# Patient Record
Sex: MALE | Race: WHITE | NOT HISPANIC OR LATINO | Employment: FULL TIME | URBAN - METROPOLITAN AREA
[De-identification: names, ages, dates, MRNs, and addresses within clinical notes are randomized per-mention and may not be internally consistent; named-entity substitution may affect disease eponyms.]

---

## 2021-07-07 PROBLEM — Z98.84 H/O BARIATRIC SURGERY: Status: ACTIVE | Noted: 2021-06-29

## 2021-07-07 PROBLEM — Z12.11 COLON CANCER SCREENING: Status: ACTIVE | Noted: 2021-07-07

## 2021-09-09 ENCOUNTER — NURSE TRIAGE (OUTPATIENT)
Dept: OTHER | Facility: OTHER | Age: 51
End: 2021-09-09

## 2021-09-09 NOTE — TELEPHONE ENCOUNTER
Reason for Disposition   [1] Caller has URGENT medicine question about med that PCP or specialist prescribed AND [2] triager unable to answer question    Additional Information   Medication questions    Answer Assessment - Initial Assessment Questions  1  REASON FOR CALL or QUESTION: "What is your reason for calling today?" or "How can I best help you?" or "What question do you have that I can help answer?"      Scheduled for colonoscopy, did bowel prep, has not had a bowel movement, should he come in for procedure?     Protocols used: MEDICATION QUESTION CALL-ADULT-, INFORMATION ONLY CALL-ADULT-

## 2021-12-02 ENCOUNTER — HOSPITAL ENCOUNTER (OUTPATIENT)
Dept: NON INVASIVE DIAGNOSTICS | Facility: CLINIC | Age: 51
Discharge: HOME | End: 2021-12-02
Payer: COMMERCIAL

## 2021-12-02 VITALS
DIASTOLIC BLOOD PRESSURE: 82 MMHG | WEIGHT: 177 LBS | HEART RATE: 53 BPM | BODY MASS INDEX: 25.34 KG/M2 | HEIGHT: 70 IN | SYSTOLIC BLOOD PRESSURE: 118 MMHG

## 2021-12-02 DIAGNOSIS — R01.1 HEART MURMUR: ICD-10-CM

## 2021-12-02 LAB
AORTIC ROOT: 4 CM
APICAL FOUR CHAMBER EJECTION FRACTION: 62 %
E WAVE DECELERATION TIME: 212 MS
FRACTIONAL SHORTENING: 33 % (ref 28–44)
INTERVENTRICULAR SEPTUM IN DIASTOLE (PARASTERNAL SHORT AXIS VIEW): 0.9 CM
LEFT ATRIUM AREA SYSTOLE SINGLE PLANE A4C: 21 CM2
LEFT INTERNAL DIMENSION IN SYSTOLE: 3.4 CM (ref 2.1–4)
LEFT VENTRICULAR INTERNAL DIMENSION IN DIASTOLE: 5.1 CM (ref 4.78–7.12)
LEFT VENTRICULAR POSTERIOR WALL IN END DIASTOLE: 0.9 CM
LEFT VENTRICULAR STROKE VOLUME: 74 ML
MV E'TISSUE VEL-SEP: 10 CM/S
MV PEAK A VEL: 0.62 M/S
MV PEAK E VEL: 90 CM/S
MV STENOSIS PRESSURE HALF TIME: 0 MS
RIGHT ATRIUM AREA SYSTOLE A4C: 15.7 CM2
RIGHT VENTRICLE ID DIMENSION: 3.1 CM
SL CV LV EF: 60
SL CV PED ECHO LEFT VENTRICLE DIASTOLIC VOLUME (MOD BIPLANE) 2D: 123 ML
SL CV PED ECHO LEFT VENTRICLE SYSTOLIC VOLUME (MOD BIPLANE) 2D: 49 ML
TRICUSPID VALVE PEAK REGURGITATION VELOCITY: 2.27 M/S
TRICUSPID VALVE S': 0.9 CM/S
TV PEAK GRADIENT: 21 MMHG
Z-SCORE OF LEFT VENTRICULAR DIMENSION IN END SYSTOLE: -1.34

## 2021-12-02 PROCEDURE — 93306 TTE W/DOPPLER COMPLETE: CPT | Performed by: INTERNAL MEDICINE

## 2021-12-02 PROCEDURE — 93306 TTE W/DOPPLER COMPLETE: CPT

## 2022-10-12 PROBLEM — Z12.11 COLON CANCER SCREENING: Status: RESOLVED | Noted: 2021-07-07 | Resolved: 2022-10-12

## 2024-10-16 DIAGNOSIS — Z23 NEED FOR IMMUNIZATION AGAINST INFLUENZA: Primary | ICD-10-CM

## 2025-04-12 ENCOUNTER — OFFICE VISIT (OUTPATIENT)
Dept: URGENT CARE | Facility: MEDICAL CENTER | Age: 55
End: 2025-04-12
Payer: COMMERCIAL

## 2025-04-12 VITALS
OXYGEN SATURATION: 98 % | HEART RATE: 80 BPM | SYSTOLIC BLOOD PRESSURE: 122 MMHG | DIASTOLIC BLOOD PRESSURE: 72 MMHG | TEMPERATURE: 98 F | RESPIRATION RATE: 18 BRPM

## 2025-04-12 DIAGNOSIS — M10.9 ACUTE GOUT OF LEFT ANKLE, UNSPECIFIED CAUSE: Primary | ICD-10-CM

## 2025-04-12 PROBLEM — R79.89 ABNORMAL LIVER FUNCTION TESTS: Status: ACTIVE | Noted: 2021-06-29

## 2025-04-12 PROBLEM — S52.539A COLLES' FRACTURE: Status: ACTIVE | Noted: 2025-04-12

## 2025-04-12 PROBLEM — E60 ZINC DEFICIENCY: Status: ACTIVE | Noted: 2021-06-29

## 2025-04-12 PROBLEM — I10 HTN (HYPERTENSION): Status: ACTIVE | Noted: 2021-06-29

## 2025-04-12 PROBLEM — E11.9 DIABETES (HCC): Chronic | Status: ACTIVE | Noted: 2021-06-29

## 2025-04-12 PROBLEM — M10.071 ACUTE IDIOPATHIC GOUT OF RIGHT FOOT: Status: ACTIVE | Noted: 2023-01-06

## 2025-04-12 PROBLEM — Z98.890 HISTORY OF COLONOSCOPY: Status: ACTIVE | Noted: 2021-09-10

## 2025-04-12 PROBLEM — K90.9 MALABSORPTION: Status: ACTIVE | Noted: 2021-06-29

## 2025-04-12 PROBLEM — E55.9 VITAMIN D DEFICIENCY: Status: ACTIVE | Noted: 2021-06-29

## 2025-04-12 PROBLEM — E56.0 VITAMIN E DEFICIENCY: Status: ACTIVE | Noted: 2021-06-29

## 2025-04-12 PROBLEM — E88.810 METABOLIC SYNDROME: Status: ACTIVE | Noted: 2021-06-29

## 2025-04-12 PROBLEM — N52.1 ERECTILE DISORDER DUE TO MEDICAL CONDITION IN MALE: Status: ACTIVE | Noted: 2023-01-06

## 2025-04-12 PROBLEM — R01.1 HEART MURMUR: Status: ACTIVE | Noted: 2025-04-12

## 2025-04-12 PROBLEM — Z77.29 EXPOSURE TO POTENTIALLY HAZARDOUS SUBSTANCE: Status: ACTIVE | Noted: 2025-04-12

## 2025-04-12 PROBLEM — K76.0 FATTY LIVER DISEASE, NONALCOHOLIC: Status: ACTIVE | Noted: 2021-06-29

## 2025-04-12 PROCEDURE — 99213 OFFICE O/P EST LOW 20 MIN: CPT

## 2025-04-12 RX ORDER — METHYLPREDNISOLONE 4 MG/1
TABLET ORAL
Qty: 1 EACH | Refills: 0 | Status: SHIPPED | OUTPATIENT
Start: 2025-04-12

## 2025-04-12 NOTE — PATIENT INSTRUCTIONS
Take steroids as prescribed.   Do not take NSAIDS, including Aleve, Ibuprofen or Mobic while on steroids.   Recommend to take them with food  May take Acetaminophen for pain.     Rest   Ice  Elevate    Avoidance of alcohol recommended, and written literature is given along with a low purine diet.    Indications for the use of allopurinol for prophylaxis and the use of colchicine to prevent or treat flare-ups is also discussed.    Follow up recommended with PCP in 3-5 days.  Proceed to ER if symptoms worsen.

## 2025-04-12 NOTE — PROGRESS NOTES
[facility]        NAME: Rj Mcpherson is a 55 y.o. male  : 1970    MRN: [id number]  DATE: 2025  TIME: 12:37 AM    Assessment and Plan   Acute gout of left ankle, unspecified cause [M10.9]  1. Acute gout of left ankle, unspecified cause  methylPREDNISolone 4 MG tablet therapy pack            Patient Instructions   Take steroids as prescribed.   Do not take NSAIDS, including Aleve, Ibuprofen or Mobic while on steroids.   Recommend to take them with food  May take Acetaminophen for pain.     Rest   Ice  Elevate    Avoidance of alcohol recommended, and written literature is given along with a low purine diet.    Indications for the use of allopurinol for prophylaxis and the use of colchicine to prevent or treat flare-ups is also discussed.    Follow up recommended with PCP in 3-5 days.  Proceed to ER if symptoms worsen.    Follow up with PCP in 3-5 days.  Proceed to  ER if symptoms worsen.    If tests are performed, our office will contact you with results only if changes need to made to the care plan discussed with you at the visit. [facility]    Chief Complaint     Chief Complaint   Patient presents with   • Gout     Left medial ankle gout; states it is tender to the touch      History of Present Illness       Ankle Pain   There was no injury mechanism (Pt reports a PMH of gout). The pain is present in the left ankle. The pain has been Constant since onset. Pertinent negatives include no inability to bear weight, loss of motion, loss of sensation, numbness or tingling. He reports no foreign bodies present. The symptoms are aggravated by palpation.       Review of Systems   Review of Systems   Constitutional: Negative.  Negative for chills, diaphoresis and fever.   Respiratory: Negative.  Negative for cough, shortness of breath and wheezing.    Cardiovascular: Negative.  Negative for chest pain and palpitations.   Musculoskeletal:  Positive for  arthralgias and joint swelling. Negative for myalgias.   Skin:  Positive for color change (redness). Negative for rash and wound.   Neurological:  Negative for tingling and numbness.     Current Medications       Current Outpatient Medications:   •  methylPREDNISolone 4 MG tablet therapy pack, Use as directed on package, Disp: 1 each, Rfl: 0  •  acyclovir (ZOVIRAX) 800 mg tablet, Take 800 mg by mouth daily, Disp: , Rfl:   •  B Complex Vitamins (B-Complex/B-12) TABS, Take 1 tablet by mouth daily, Disp: , Rfl:   •  beta carotene 15 MG capsule, Take 15 mg by mouth daily, Disp: , Rfl:   •  Iron, Ferrous Sulfate, 142 (45 Fe) MG TBCR, Take 45 mg by mouth daily, Disp: , Rfl:   •  Multiple Vitamin (multivitamin) capsule, Take 1 capsule by mouth daily, Disp: , Rfl:   •  polyethylene glycol-electrolytes (NULYTELY) 4000 mL solution, Take 4,000 mL by mouth once for 1 dose, Disp: 4000 mL, Rfl: 0  •  Sod Picosulfate-Mag Ox-Cit Acd (Clenpiq) 10-3.5-12 MG-GM -GM/160ML SOLN, Take 1 kit by mouth see administration instructions Colonoscopy bowel prep, Disp: 1 mL, Rfl: 0  •  tadalafil (CIALIS) 20 MG tablet, Take 20 mg by mouth daily as needed for erectile dysfunction, Disp: , Rfl:   •  VITAMIN A PO, Take 3,000 mcg by mouth daily, Disp: , Rfl:   •  vitamin B-12 (VITAMIN B-12) 1,000 mcg tablet, Take 1,000 mcg by mouth daily, Disp: , Rfl:   •  Vitamin D, Cholecalciferol, 50 MCG (2000 UT) CAPS, Take 2,000 Units by mouth daily, Disp: , Rfl:   •  Zinc 30 MG CAPS, Take 30 mg by mouth daily, Disp: , Rfl:     Current Allergies     Allergies as of 04/12/2025 - Reviewed 04/12/2025   Allergen Reaction Noted   • Pollen extract Other (See Comments) 08/24/2021            The following portions of the patient's history were reviewed and updated as appropriate: allergies, current medications, past family history, past medical history, past social history, past surgical history and problem list.     History reviewed. No pertinent past medical  history.    Past Surgical History:   Procedure Laterality Date   • BARIATRIC SURGERY      Duodenal switch 2014   • UMBILICAL HERNIA REPAIR         Family History   Problem Relation Age of Onset   • Colon polyps Father    • Colon polyps Paternal Grandfather    • Cancer Paternal Grandfather         unknown type         Medications have been verified.        Objective   /72   Pulse 80   Temp 98 °F (36.7 °C) (Temporal)   Resp 18   SpO2 98%        Physical Exam     Physical Exam  Vitals and nursing note reviewed.   Constitutional:       General: He is not in acute distress.     Appearance: Normal appearance. He is not ill-appearing, toxic-appearing or diaphoretic.   HENT:      Head: Normocephalic.   Cardiovascular:      Rate and Rhythm: Normal rate and regular rhythm.      Pulses: Normal pulses.      Heart sounds: Normal heart sounds. No murmur heard.  Pulmonary:      Effort: Pulmonary effort is normal. No respiratory distress.      Breath sounds: Normal breath sounds. No stridor. No wheezing, rhonchi or rales.   Chest:      Chest wall: No tenderness.   Musculoskeletal:         General: No deformity or signs of injury.      Left ankle: Swelling present. Tenderness present.        Legs:    Skin:     General: Skin is warm.      Findings: Erythema present.   Neurological:      Mental Status: He is alert.